# Patient Record
Sex: MALE | Race: OTHER | NOT HISPANIC OR LATINO | URBAN - METROPOLITAN AREA
[De-identification: names, ages, dates, MRNs, and addresses within clinical notes are randomized per-mention and may not be internally consistent; named-entity substitution may affect disease eponyms.]

---

## 2019-08-09 ENCOUNTER — EMERGENCY (EMERGENCY)
Facility: HOSPITAL | Age: 24
LOS: 1 days | Discharge: HOME | End: 2019-08-09
Attending: EMERGENCY MEDICINE | Admitting: EMERGENCY MEDICINE
Payer: SELF-PAY

## 2019-08-09 VITALS
RESPIRATION RATE: 17 BRPM | DIASTOLIC BLOOD PRESSURE: 91 MMHG | HEART RATE: 105 BPM | TEMPERATURE: 98 F | OXYGEN SATURATION: 97 % | SYSTOLIC BLOOD PRESSURE: 146 MMHG

## 2019-08-09 PROCEDURE — 99053 MED SERV 10PM-8AM 24 HR FAC: CPT

## 2019-08-09 PROCEDURE — 99283 EMERGENCY DEPT VISIT LOW MDM: CPT

## 2019-08-09 NOTE — ED ADULT NURSE NOTE - CHIEF COMPLAINT QUOTE
BIBA ambulatory, picked up from [facility] for alcohol intoxication. Pt admits to alcohol use. Uncooperative on arrival. Requesting to leave.

## 2019-08-09 NOTE — ED ADULT NURSE NOTE - NSIMPLEMENTINTERV_GEN_ALL_ED
Implemented All Fall Risk Interventions:  [place] to call system. Call bell, personal items and telephone within reach. Instruct patient to call for assistance. Room bathroom lighting operational. Non-slip footwear when patient is off stretcher. Physically safe environment: no spills, clutter or unnecessary equipment. Stretcher in lowest position, wheels locked, appropriate side rails in place. Provide visual cue, wrist band, yellow gown, etc. Monitor gait and stability. Monitor for mental status changes and reorient to person, place, and time. Review medications for side effects contributing to fall risk. Reinforce activity limits and safety measures with patient and family.

## 2019-08-09 NOTE — ED PROVIDER NOTE - NSFOLLOWUPINSTRUCTIONS_ED_ALL_ED_FT
Follow up with your primary care doctor  Return immediately for any new or worsening symptoms or any new concerns

## 2019-08-09 NOTE — ED PROVIDER NOTE - CLINICAL SUMMARY MEDICAL DECISION MAKING FREE TEXT BOX
alcohol intox, clinically sober, steady gait, denies trauma, denies pain, no evidence of trauma on exam, seeking dc,

## 2019-08-09 NOTE — ED PROVIDER NOTE - PHYSICAL EXAMINATION
Physical Exam  GEN: ao x 3, clear speech, clinically sober  EYES: PERRL, full EOMI,  ENT: External inspection normal, normal voice, no oropharyngeal ulcerations/lesions/swelling  HEAD: atraumatic  NECK: FROM neck, supple, no meningismus, trachea midline, no JVD  GI: no vomiting, no distension  MSK: FROM all 4 extremities, N/V intact,   SKIN: Color normal for race, warm and dry, no rash  NEURO: ao x 3, clear speech, clinically sober, steady gait

## 2019-08-09 NOTE — ED ADULT TRIAGE NOTE - CHIEF COMPLAINT QUOTE
BIBA ambulatory, picked up from [facility] for alcohol intoxication. Pt admits to alcohol use. Uncooperative on arrival. Requesting to leave.

## 2019-08-13 DIAGNOSIS — R41.82 ALTERED MENTAL STATUS, UNSPECIFIED: ICD-10-CM

## 2019-08-13 DIAGNOSIS — F10.129 ALCOHOL ABUSE WITH INTOXICATION, UNSPECIFIED: ICD-10-CM

## 2023-07-05 NOTE — ED ADULT NURSE NOTE - CAS EDN DISCHARGE ASSESSMENT
Detail Level: Detailed
Add 89557 Cpt? (Important Note: In 2017 The Use Of 38658 Is Being Tracked By Cms To Determine Future Global Period Reimbursement For Global Periods): no
Alert and oriented to person, place and time/steady gait